# Patient Record
Sex: FEMALE | Race: WHITE | NOT HISPANIC OR LATINO
[De-identification: names, ages, dates, MRNs, and addresses within clinical notes are randomized per-mention and may not be internally consistent; named-entity substitution may affect disease eponyms.]

---

## 2017-02-24 PROBLEM — Z00.00 ENCOUNTER FOR PREVENTIVE HEALTH EXAMINATION: Status: ACTIVE | Noted: 2017-02-24

## 2017-03-06 ENCOUNTER — RESULT REVIEW (OUTPATIENT)
Age: 57
End: 2017-03-06

## 2017-03-21 ENCOUNTER — APPOINTMENT (OUTPATIENT)
Dept: MAMMOGRAPHY | Facility: IMAGING CENTER | Age: 57
End: 2017-03-21

## 2017-03-21 ENCOUNTER — OUTPATIENT (OUTPATIENT)
Dept: OUTPATIENT SERVICES | Facility: HOSPITAL | Age: 57
LOS: 1 days | End: 2017-03-21
Payer: COMMERCIAL

## 2017-03-21 DIAGNOSIS — Z00.8 ENCOUNTER FOR OTHER GENERAL EXAMINATION: ICD-10-CM

## 2017-03-21 PROCEDURE — 77063 BREAST TOMOSYNTHESIS BI: CPT

## 2017-03-21 PROCEDURE — 77067 SCR MAMMO BI INCL CAD: CPT

## 2018-05-28 ENCOUNTER — RESULT REVIEW (OUTPATIENT)
Age: 58
End: 2018-05-28

## 2022-02-10 ENCOUNTER — EMERGENCY (EMERGENCY)
Facility: HOSPITAL | Age: 62
LOS: 1 days | Discharge: HOME | End: 2022-02-10
Attending: EMERGENCY MEDICINE
Payer: MEDICAID

## 2022-02-10 VITALS
DIASTOLIC BLOOD PRESSURE: 85 MMHG | HEART RATE: 87 BPM | SYSTOLIC BLOOD PRESSURE: 146 MMHG | WEIGHT: 119.93 LBS | OXYGEN SATURATION: 100 % | RESPIRATION RATE: 17 BRPM | HEIGHT: 65 IN | TEMPERATURE: 98 F

## 2022-02-10 VITALS
OXYGEN SATURATION: 95 % | DIASTOLIC BLOOD PRESSURE: 72 MMHG | SYSTOLIC BLOOD PRESSURE: 120 MMHG | HEART RATE: 89 BPM | RESPIRATION RATE: 18 BRPM | TEMPERATURE: 98 F

## 2022-02-10 LAB
ALBUMIN SERPL ELPH-MCNC: 3.4 G/DL — LOW (ref 3.5–5)
ALP SERPL-CCNC: 112 U/L — SIGNIFICANT CHANGE UP (ref 40–120)
ALT FLD-CCNC: 20 U/L DA — SIGNIFICANT CHANGE UP (ref 10–60)
ANION GAP SERPL CALC-SCNC: 5 MMOL/L — SIGNIFICANT CHANGE UP (ref 5–17)
AST SERPL-CCNC: 19 U/L — SIGNIFICANT CHANGE UP (ref 10–40)
BASOPHILS # BLD AUTO: 0.05 K/UL — SIGNIFICANT CHANGE UP (ref 0–0.2)
BASOPHILS NFR BLD AUTO: 0.5 % — SIGNIFICANT CHANGE UP (ref 0–2)
BILIRUB SERPL-MCNC: 0.2 MG/DL — SIGNIFICANT CHANGE UP (ref 0.2–1.2)
BUN SERPL-MCNC: 15 MG/DL — SIGNIFICANT CHANGE UP (ref 7–18)
CALCIUM SERPL-MCNC: 8.9 MG/DL — SIGNIFICANT CHANGE UP (ref 8.4–10.5)
CHLORIDE SERPL-SCNC: 109 MMOL/L — HIGH (ref 96–108)
CK SERPL-CCNC: 52 U/L — SIGNIFICANT CHANGE UP (ref 21–215)
CO2 SERPL-SCNC: 27 MMOL/L — SIGNIFICANT CHANGE UP (ref 22–31)
CREAT SERPL-MCNC: 0.53 MG/DL — SIGNIFICANT CHANGE UP (ref 0.5–1.3)
EOSINOPHIL # BLD AUTO: 0.72 K/UL — HIGH (ref 0–0.5)
EOSINOPHIL NFR BLD AUTO: 6.9 % — HIGH (ref 0–6)
GLUCOSE SERPL-MCNC: 111 MG/DL — HIGH (ref 70–99)
HCT VFR BLD CALC: 38.5 % — SIGNIFICANT CHANGE UP (ref 34.5–45)
HGB BLD-MCNC: 12.8 G/DL — SIGNIFICANT CHANGE UP (ref 11.5–15.5)
IMM GRANULOCYTES NFR BLD AUTO: 0.4 % — SIGNIFICANT CHANGE UP (ref 0–1.5)
LYMPHOCYTES # BLD AUTO: 1.13 K/UL — SIGNIFICANT CHANGE UP (ref 1–3.3)
LYMPHOCYTES # BLD AUTO: 10.8 % — LOW (ref 13–44)
MAGNESIUM SERPL-MCNC: 2.4 MG/DL — SIGNIFICANT CHANGE UP (ref 1.6–2.6)
MCHC RBC-ENTMCNC: 30.1 PG — SIGNIFICANT CHANGE UP (ref 27–34)
MCHC RBC-ENTMCNC: 33.2 GM/DL — SIGNIFICANT CHANGE UP (ref 32–36)
MCV RBC AUTO: 90.6 FL — SIGNIFICANT CHANGE UP (ref 80–100)
MONOCYTES # BLD AUTO: 0.61 K/UL — SIGNIFICANT CHANGE UP (ref 0–0.9)
MONOCYTES NFR BLD AUTO: 5.8 % — SIGNIFICANT CHANGE UP (ref 2–14)
NEUTROPHILS # BLD AUTO: 7.91 K/UL — HIGH (ref 1.8–7.4)
NEUTROPHILS NFR BLD AUTO: 75.6 % — SIGNIFICANT CHANGE UP (ref 43–77)
NRBC # BLD: 0 /100 WBCS — SIGNIFICANT CHANGE UP (ref 0–0)
PLATELET # BLD AUTO: 235 K/UL — SIGNIFICANT CHANGE UP (ref 150–400)
POTASSIUM SERPL-MCNC: 3.8 MMOL/L — SIGNIFICANT CHANGE UP (ref 3.5–5.3)
POTASSIUM SERPL-SCNC: 3.8 MMOL/L — SIGNIFICANT CHANGE UP (ref 3.5–5.3)
PROT SERPL-MCNC: 7 G/DL — SIGNIFICANT CHANGE UP (ref 6–8.3)
RBC # BLD: 4.25 M/UL — SIGNIFICANT CHANGE UP (ref 3.8–5.2)
RBC # FLD: 12.1 % — SIGNIFICANT CHANGE UP (ref 10.3–14.5)
SODIUM SERPL-SCNC: 141 MMOL/L — SIGNIFICANT CHANGE UP (ref 135–145)
TROPONIN I, HIGH SENSITIVITY RESULT: 18 NG/L — SIGNIFICANT CHANGE UP
WBC # BLD: 10.46 K/UL — SIGNIFICANT CHANGE UP (ref 3.8–10.5)
WBC # FLD AUTO: 10.46 K/UL — SIGNIFICANT CHANGE UP (ref 3.8–10.5)

## 2022-02-10 PROCEDURE — 80053 COMPREHEN METABOLIC PANEL: CPT

## 2022-02-10 PROCEDURE — 99285 EMERGENCY DEPT VISIT HI MDM: CPT

## 2022-02-10 PROCEDURE — 84484 ASSAY OF TROPONIN QUANT: CPT

## 2022-02-10 PROCEDURE — 85025 COMPLETE CBC W/AUTO DIFF WBC: CPT

## 2022-02-10 PROCEDURE — 82550 ASSAY OF CK (CPK): CPT

## 2022-02-10 PROCEDURE — 83735 ASSAY OF MAGNESIUM: CPT

## 2022-02-10 PROCEDURE — 93010 ELECTROCARDIOGRAM REPORT: CPT

## 2022-02-10 PROCEDURE — 71045 X-RAY EXAM CHEST 1 VIEW: CPT

## 2022-02-10 PROCEDURE — 36415 COLL VENOUS BLD VENIPUNCTURE: CPT

## 2022-02-10 PROCEDURE — 99285 EMERGENCY DEPT VISIT HI MDM: CPT | Mod: 25

## 2022-02-10 PROCEDURE — 71045 X-RAY EXAM CHEST 1 VIEW: CPT | Mod: 26

## 2022-02-10 PROCEDURE — 94640 AIRWAY INHALATION TREATMENT: CPT

## 2022-02-10 PROCEDURE — 93005 ELECTROCARDIOGRAM TRACING: CPT

## 2022-02-10 PROCEDURE — 96374 THER/PROPH/DIAG INJ IV PUSH: CPT

## 2022-02-10 RX ORDER — ALBUTEROL 90 UG/1
3 AEROSOL, METERED ORAL
Qty: 80 | Refills: 0
Start: 2022-02-10 | End: 2022-02-19

## 2022-02-10 RX ORDER — IPRATROPIUM/ALBUTEROL SULFATE 18-103MCG
3 AEROSOL WITH ADAPTER (GRAM) INHALATION
Refills: 0 | Status: COMPLETED | OUTPATIENT
Start: 2022-02-10 | End: 2022-02-10

## 2022-02-10 RX ORDER — SODIUM CHLORIDE 9 MG/ML
1000 INJECTION INTRAMUSCULAR; INTRAVENOUS; SUBCUTANEOUS
Refills: 0 | Status: DISCONTINUED | OUTPATIENT
Start: 2022-02-10 | End: 2022-02-14

## 2022-02-10 RX ORDER — ASPIRIN/CALCIUM CARB/MAGNESIUM 324 MG
162 TABLET ORAL ONCE
Refills: 0 | Status: COMPLETED | OUTPATIENT
Start: 2022-02-10 | End: 2022-02-10

## 2022-02-10 RX ADMIN — Medication 125 MILLIGRAM(S): at 20:50

## 2022-02-10 RX ADMIN — Medication 3 MILLILITER(S): at 21:34

## 2022-02-10 RX ADMIN — SODIUM CHLORIDE 125 MILLILITER(S): 9 INJECTION INTRAMUSCULAR; INTRAVENOUS; SUBCUTANEOUS at 21:34

## 2022-02-10 RX ADMIN — Medication 3 MILLILITER(S): at 21:03

## 2022-02-10 RX ADMIN — Medication 162 MILLIGRAM(S): at 20:50

## 2022-02-10 RX ADMIN — Medication 3 MILLILITER(S): at 20:45

## 2022-02-10 NOTE — ED PROVIDER NOTE - CLINICAL SUMMARY MEDICAL DECISION MAKING FREE TEXT BOX
pt s/p hot yoga, then developed sob, improved with neb, concern for pulmonary pathology, unlikely cardiac, will get labs, EKG, Trop., give treatment, reassess.

## 2022-02-10 NOTE — ED PROVIDER NOTE - PROGRESS NOTE DETAILS
, Lungs- dec. BS andres lower lungs, walked pt- HR 98, O2 sat 94-95%, pt insists that she go home.  Advised to f/u with Pulmonary ASAP.  Suspect pt may have bronchiectasis , Lungs- dec. BS andres lower lungs, walked pt- HR 98, O2 sat 94-95%, Advised admission, pt insists that she go home.  Advised to f/u with Pulmonary ASAP.  Suspect pt may have bronchiectasis, to return to ED if condition worsens

## 2022-02-10 NOTE — ED PROVIDER NOTE - PATIENT PORTAL LINK FT
You can access the FollowMyHealth Patient Portal offered by [facility] by registering at the following website: [email or web address]. By joining [employer]’s FollowMyHealth portal, you will also be able to view your health information using other applications (apps) compatible with our system.

## 2022-02-10 NOTE — ED PROVIDER NOTE - OBJECTIVE STATEMENT
61 y.o. female claims she spent an hour in a hot yoga studio after work.  Pt developed sob while walking up an incline, inc. fatigue.  Pt used Albuterol with no relief, h/o chronic dry cough.  Pt developed chest tightness, lightheadedness, palpitations, no wheezing, diaphoresis, n/v.  Pt stopped by the back, 911 called, given 1 neb by EMS.  Pt's vaccinated with Pfizer

## 2022-02-10 NOTE — ED ADULT TRIAGE NOTE - CHIEF COMPLAINT QUOTE
Pt hx. of asthma, no prior intubation, c/o difficulty breathing x this afternoon. Received Nebulizer x 1 dose from EMS. Pt speak clear and full sentences.

## 2022-02-10 NOTE — ED PROVIDER NOTE - CARE PLAN
1 Principal Discharge DX:	Acute asthma exacerbation   Principal Discharge DX:	Acute asthma exacerbation  Secondary Diagnosis:	Bronchiectasis

## 2022-05-24 ENCOUNTER — EMERGENCY (EMERGENCY)
Facility: HOSPITAL | Age: 62
LOS: 1 days | Discharge: HOME | End: 2022-05-24
Attending: EMERGENCY MEDICINE
Payer: MEDICAID

## 2022-05-24 VITALS
HEIGHT: 65 IN | HEART RATE: 80 BPM | OXYGEN SATURATION: 97 % | WEIGHT: 121.92 LBS | DIASTOLIC BLOOD PRESSURE: 86 MMHG | RESPIRATION RATE: 18 BRPM | TEMPERATURE: 98 F | SYSTOLIC BLOOD PRESSURE: 144 MMHG

## 2022-05-24 VITALS
DIASTOLIC BLOOD PRESSURE: 79 MMHG | RESPIRATION RATE: 17 BRPM | OXYGEN SATURATION: 98 % | HEART RATE: 75 BPM | TEMPERATURE: 98 F | SYSTOLIC BLOOD PRESSURE: 138 MMHG

## 2022-05-24 PROCEDURE — 73590 X-RAY EXAM OF LOWER LEG: CPT

## 2022-05-24 PROCEDURE — 93971 EXTREMITY STUDY: CPT | Mod: 26,LT

## 2022-05-24 PROCEDURE — 99284 EMERGENCY DEPT VISIT MOD MDM: CPT

## 2022-05-24 PROCEDURE — 93971 EXTREMITY STUDY: CPT

## 2022-05-24 PROCEDURE — 99284 EMERGENCY DEPT VISIT MOD MDM: CPT | Mod: 25

## 2022-05-24 PROCEDURE — 73590 X-RAY EXAM OF LOWER LEG: CPT | Mod: 26,LT

## 2022-05-24 RX ORDER — IBUPROFEN 200 MG
400 TABLET ORAL ONCE
Refills: 0 | Status: COMPLETED | OUTPATIENT
Start: 2022-05-24 | End: 2022-05-24

## 2022-05-24 RX ADMIN — Medication 400 MILLIGRAM(S): at 13:14

## 2022-05-24 NOTE — ED PROVIDER NOTE - NSFOLLOWUPINSTRUCTIONS_ED_ALL_ED_FT
[facility] [place]                                                                                                                                                                                                                                                                                                                                                                                                                                                                                                                                                                                                                                                                                                                                                                                                                                                                                                                                                                                                                                                                                                                                                                                                                                                                                                                                                                                                                                                                                                                                                                                                                                                                                                                                                                                                                                                                                                                                                                                                                                                                                                                                                                                                                                                                                                                                                                                                                                                                                                                                                                                                                                                                                                                                                                                                                                                                                                                                                                                                                   Tibial and Fibular Fractures       Tibial and fibular fractures are breaks in both of the lower leg bones (tibia and fibula). The tibia, also called the shin bone, is the larger of these two bones. The fibula is the smaller of the two bones and is on the outer side of the leg.    When tibiofibular fractures happen, the bones may:  •Break, but stay close to their normal position (stable or nondisplaced fracture).      •Break and move out of their normal position (unstable or displaced fracture).      •Break into several small pieces (comminuted fracture).      •Break through the skin (compound or open fracture).        What are the causes?    This condition is caused by injuries, such as:  •Falls from significant heights or falls while cycling.      •Sports contact injuries, like collisions in football or soccer.      •Severe, forceful twisting of your leg, such as falls while skiing.      •Car or motorcycle crashes.        What increases the risk?    You are more likely to develop this condition if:  •You participate in sports, especially contact sports or sports that may involve impact or twisting, like football or skiing.      •You smoke.        What are the signs or symptoms?    Symptoms of this condition include:  •Pain, swelling, and bruising in the lower leg, ankle, or knee.      •Difficulty walking or putting weight on your injured leg.      •Change in the shape of your leg at the site of the injury.      •Pain that gets worse with moving or standing and gets better with rest.        How is this diagnosed?    This condition is diagnosed with a physical exam and X-rays. In some cases, a CT scan may be done to help diagnose certain types of fractures.      How is this treated?    Treatment for this condition depends on the type and severity of your fractures.  •If your bones did not move out of place and your leg is still stable, or if you are unable to have surgery, you may be treated with a cast, brace, or walking boot. This keeps the bones in place (immobile) while they heal.      •If your bones are out of place or if your leg is unstable, you may need surgery. Surgery is common for tibial and fibular fractures. During surgery, the bones are moved back into their normal position, and a neil, plate, or screws are used to hold the bones in place. After surgery, the leg is put in a splint or cast.      Treatment may also include:  •Medicine, ice, and elevation of the leg to help relieve pain and inflammation.      •Using crutches or a walker while you heal.      •Exercises to strengthen leg and ankle muscles and restore motion (physical therapy).        Follow these instructions at home:    If you have a splint, brace, or walking boot:     •Wear it as told by your health care provider. Remove it only as told by your health care provider. Some types of splints can only be removed by your health care provider.      •Loosen it if your toes tingle, become numb, or turn cold and blue.      •Keep it clean and dry.      If you have a cast:     • Do not stick anything inside the cast to scratch your skin. Doing that increases your risk of infection.      •Check the skin around the cast every day. Tell your health care provider about any concerns.      •You may put lotion on dry skin around the edges of the cast. Do not put lotion on the skin underneath the cast.       •Keep it clean and dry.      Bathing     • Do not take baths, swim, or use a hot tub until your health care provider approves. Ask your health care provider if you may take showers. You may only be allowed to take sponge baths.    •If the cast, splint, brace, or boot is not waterproof:  •Do not let it get wet.      •Cover it with a watertight covering when you take a bath or a shower.          Managing pain, stiffness, and swelling    •If directed, put ice on the injured area. To do this:  •If you have a removable splint, brace, or boot, remove it as told by your health care provider.      •Put ice in a plastic bag.      •Place a towel between your skin and the bag or between your splint or cast and the bag.      •Leave the ice on for 20 minutes, 2–3 times a day.      •Remove the ice if your skin turns bright red. This is very important. If you cannot feel pain, heat, or cold, you have a greater risk of damage to the area.        •Move your toes often to reduce stiffness and swelling.      •Raise (elevate) the injured area above the level of your heart while you are sitting or lying down.      Activity     •Return to your normal activities as told by your health care provider. Ask your health care provider what activities are safe for you.      •Do exercises as told by your health care provider.      • Do not use the injured limb to support your body weight until your health care provider says that you can. Use crutches or a walker as told by your health care provider.      Driving     •Ask your health care provider when it is safe to drive if you have a cast, splint, brace, or walking boot on your leg.      •Ask your health care provider if the medicine prescribed to you requires you to avoid driving or using machinery.         General instructions      • Do not put pressure on any part of the cast or splint until it is fully hardened. This may take several hours.       • Do not use any products that contain nicotine or tobacco, such as cigarettes e-cigarettes, and chewing tobacco. These can delay bone healing. If you need help quitting, ask your health care provider.      •Take over-the-counter and prescription medicines only as told by your health care provider.      •Keep all follow-up visits. This is important.        Contact a health care provider if:    •You have pain that gets worse or does not get better with rest or medicine.      •You have more swelling or redness in your foot.        Get help right away if:  •Your skin or nails below your injury:  •Turn blue or gray.      •Feel cold.      •Become numb.      •Become less sensitive to touch.        •You develop new or severe pain in your leg or foot.      •You have tingling, burning, and tightness in your lower leg.        Summary    •Tibial and fibular fractures are breaks in both of the lower leg bones (tibia and fibula).      •If your bones are out of place or if your leg is unstable, you may need surgery. Surgery is common for tibial and fibular fractures.      •If directed, put ice on the injured area for 20 minutes, 2–3 times a day.      •Pain medicine and elevating your injured leg will help control pain and reduce swelling. Follow directions as told by your health care provider.      This information is not intended to replace advice given to you by your health care provider. Make sure you discuss any questions you have with your health care provider.      Document Revised: 05/05/2021 Document Reviewed: 05/05/2021    [employer] Patient Education © 2022 [employer]

## 2022-05-24 NOTE — ED ADULT NURSE NOTE - CHIEF COMPLAINT QUOTE
walked in with c/o  fall last  friday c/o  rt ankle pain denies any LOC and denies any head trauma nor injury

## 2022-05-24 NOTE — ED PROVIDER NOTE - OBJECTIVE STATEMENT
62 y/o slipped and fell on Friday. Complaining of left leg pain. Has some difficulty walking. Has swelling to the legs. Past medical history of asthma. No known drug allergies.

## 2022-05-24 NOTE — ED PROVIDER NOTE - PHYSICAL EXAMINATION
Musculoskeletal: swelling and tenderness over the left leg, ecchymosis over the left foot, patient able to weight-bear w/o too much difficulty, distal pulses noted

## 2022-05-24 NOTE — ED ADULT NURSE NOTE - NSFALLRSKASSESSDT_ED_ALL_ED
H&P reviewed   Patient examined   No changes or interval changes noted     Rambo Zhang MD
24-May-2022 13:15

## 2022-05-24 NOTE — ED PROVIDER NOTE - CLINICAL SUMMARY MEDICAL DECISION MAKING FREE TEXT BOX
X-ray the left leg to rule out fracture and get a Doppler because of the swelling. Doppler is negative. X-ray shows non-displaced fracture of the left fibula. ACE bandage applied. Will refer to primary care doctor.

## 2022-05-24 NOTE — ED PROVIDER NOTE - PATIENT PORTAL LINK FT
You can access the FollowMyHealth Patient Portal offered by [facility] by registering at the following website: [email or web address]. By joining [employer]’s FollowMyHealth portal, you will also be able to view your health information using other applications (apps) compatible with our system.
